# Patient Record
Sex: MALE | Race: WHITE
[De-identification: names, ages, dates, MRNs, and addresses within clinical notes are randomized per-mention and may not be internally consistent; named-entity substitution may affect disease eponyms.]

---

## 2021-01-17 ENCOUNTER — HOSPITAL ENCOUNTER (EMERGENCY)
Dept: HOSPITAL 41 - JD.ED | Age: 61
Discharge: NURSING, REHAB OR LONG-TERM CARE | End: 2021-01-17
Payer: COMMERCIAL

## 2021-01-17 DIAGNOSIS — M19.90: ICD-10-CM

## 2021-01-17 DIAGNOSIS — R79.1: Primary | ICD-10-CM

## 2021-01-17 DIAGNOSIS — R58: ICD-10-CM

## 2021-01-17 DIAGNOSIS — Z95.2: ICD-10-CM

## 2021-01-17 DIAGNOSIS — R00.0: ICD-10-CM

## 2021-01-17 DIAGNOSIS — Z79.01: ICD-10-CM

## 2021-01-17 DIAGNOSIS — Z20.822: ICD-10-CM

## 2021-01-17 DIAGNOSIS — Z79.899: ICD-10-CM

## 2021-01-17 LAB — SARS-COV-2 RNA RESP QL NAA+PROBE: NEGATIVE

## 2021-01-17 PROCEDURE — 84484 ASSAY OF TROPONIN QUANT: CPT

## 2021-01-17 PROCEDURE — 82553 CREATINE MB FRACTION: CPT

## 2021-01-17 PROCEDURE — 96368 THER/DIAG CONCURRENT INF: CPT

## 2021-01-17 PROCEDURE — 96365 THER/PROPH/DIAG IV INF INIT: CPT

## 2021-01-17 PROCEDURE — 93005 ELECTROCARDIOGRAM TRACING: CPT

## 2021-01-17 PROCEDURE — P9017 PLASMA 1 DONOR FRZ W/IN 8 HR: HCPCS

## 2021-01-17 PROCEDURE — 74177 CT ABD & PELVIS W/CONTRAST: CPT

## 2021-01-17 PROCEDURE — C9132 KCENTRA, PER I.U.: HCPCS

## 2021-01-17 PROCEDURE — 86900 BLOOD TYPING SEROLOGIC ABO: CPT

## 2021-01-17 PROCEDURE — 86140 C-REACTIVE PROTEIN: CPT

## 2021-01-17 PROCEDURE — 86901 BLOOD TYPING SEROLOGIC RH(D): CPT

## 2021-01-17 PROCEDURE — 71045 X-RAY EXAM CHEST 1 VIEW: CPT

## 2021-01-17 PROCEDURE — 85610 PROTHROMBIN TIME: CPT

## 2021-01-17 PROCEDURE — 85730 THROMBOPLASTIN TIME PARTIAL: CPT

## 2021-01-17 PROCEDURE — 99285 EMERGENCY DEPT VISIT HI MDM: CPT

## 2021-01-17 PROCEDURE — 83690 ASSAY OF LIPASE: CPT

## 2021-01-17 PROCEDURE — 85025 COMPLETE CBC W/AUTO DIFF WBC: CPT

## 2021-01-17 PROCEDURE — 87040 BLOOD CULTURE FOR BACTERIA: CPT

## 2021-01-17 PROCEDURE — 36415 COLL VENOUS BLD VENIPUNCTURE: CPT

## 2021-01-17 PROCEDURE — 83605 ASSAY OF LACTIC ACID: CPT

## 2021-01-17 PROCEDURE — 86850 RBC ANTIBODY SCREEN: CPT

## 2021-01-17 PROCEDURE — 80053 COMPREHEN METABOLIC PANEL: CPT

## 2021-01-17 PROCEDURE — 0240U: CPT

## 2021-01-17 PROCEDURE — 74176 CT ABD & PELVIS W/O CONTRAST: CPT

## 2021-01-17 PROCEDURE — 36430 TRANSFUSION BLD/BLD COMPNT: CPT

## 2021-01-17 NOTE — EDM.PDOC
ED HPI GENERAL MEDICAL PROBLEM





- General


Chief Complaint: Chest Pain


Stated Complaint: [facility]


Time Seen by Provider: 01/17/21 16:12


Source of Information: Reports: Patient, RN Notes Reviewed


History Limitations: Reports: No Limitations





- History of Present Illness


INITIAL COMMENTS - FREE TEXT/NARRATIVE: 





Patient is a 60-year-old male who is brought into the  ER by [place] ambulance

service for the evaluation of his right flank/lower back pain.  Patient notes 

this started last night, he did google some things, and thought maybe it could 

be his appendix.  He states that the pain or discomfort sometimes settles into 

his chest, that seemed to be more oa congestion/pressure feeling and would rate 

this at a 2 out of 10.   He notes he has had one stent in his heart placed 

roughly 20 years ago for rheumatic fever complications. He also has a prosthetic

heart valve and is on Warfarin. He denies any shortness of breath at rest, but 

does complain about getting short of breath with exercise, he states he was 

mildly diaphoretic when the pain was at its worst, but he is not diaphoretic 

now.  He is not had pain like this prior to yesterday.  He does have a history 

of arthritis and does take meloxicam, and states he did take some of this last 

night and it seemed to help his back pain.  He has had no fevers or chills, 

cough, or any nausea/vomiting/diarrhea.


  ** Chest


Pain Score (Numeric/FACES): 2





- Related Data


                                    Allergies











Allergy/AdvReac Type Severity Reaction Status Date / Time


 


No Known Allergies Allergy   Verified 01/17/21 15:58











Home Meds: 


                                    Home Meds





DULoxetine [Cymbalta] 60 mg PO DAILY 01/17/21 [History]


Meloxicam 15 mg PO DAILY 01/17/21 [History]


Rosuvastatin [Crestor] 10 mg PO DAILY 01/17/21 [History]


Warfarin [Coumadin] 2.5 mg PO MOWEFRSA 01/17/21 [History]


Warfarin [Coumadin] 5 mg PO SUTUTH 01/17/21 [History]











Past Medical History


Cardiovascular History: Reports: Stents


Musculoskeletal History: Reports: Arthritis





- Infectious Disease History


Infectious Disease History: Reports: Measles





Social & Family History





- Tobacco Use


Tobacco Use Status *Q: Never Tobacco User





- Caffeine Use


Caffeine Use: Reports: Coffee





- Recreational Drug Use


Recreational Drug Use: No





ED ROS GENERAL





- Review of Systems


Review Of Systems: Comprehensive ROS is negative, except as noted in HPI.





ED EXAM, GENERAL





- Physical Exam


Exam: See Below


Exam Limited By: No Limitations


General Appearance: Alert, WD/WN, No Apparent Distress


Respiratory/Chest: No Respiratory Distress, Lungs Clear, Normal Breath Sounds, 

No Accessory Muscle Use, Chest Non-Tender


Cardiovascular: Normal Peripheral Pulses, Regular Rate, Rhythm, No Murmur


Peripheral Pulses: 2+: Radial (L), Radial (R)


Extremities: Normal Inspection, Normal Capillary Refill


Neurological: Alert, Oriented, Normal Cognition, No Motor/Sensory Deficits


Psychiatric: Normal Mood, Flat Affect


Skin Exam: Warm, Dry, Intact, Normal Color, No Rash


  ** #1 Interpretation


EKG Date: 01/17/21


Time: 15:49


Rhythm: NSR (sinus tach)


Rate (Beats/Min): 108


Axis: LAD-Left Axis Deviation (-26 )


P-Wave: Present


QRS: Normal


ST-T: Normal


QT: Normal


Comparison: NA - No Prior EKG


EKG Interpretation Comments: 





No obvious ischemia or acute ST changes noted, reviewed by myself and Dr. Henry.





Course





- Vital Signs


Last Recorded V/S: 


                                Last Vital Signs











Temp  96.6 F L  01/17/21 19:39


 


Pulse  104 H  01/17/21 20:06


 


Resp  13   01/17/21 20:06


 


BP  113/70   01/17/21 20:06


 


Pulse Ox  100   01/17/21 15:52














- Orders/Labs/Meds


Orders: 


                               Active Orders 24 hr











 Category Date Time Status


 


 CULTURE BLOOD [BC] Stat Lab  01/17/21 17:29 Received


 


 CULTURE BLOOD [BC] Stat Lab  01/17/21 17:45 Received


 


 UA W/MICROSCOPIC [URIN] Stat Lab  01/17/21 17:06 Ordered


 


 Blood Culture x2 Reflex Set [OM.PC] Stat Oth  01/17/21 17:11 Ordered


 


 Transfuse Fresh Frozen Plasma [COMM] Stat Oth  01/17/21 18:15 Ordered


 


 Transfuse Fresh Frozen Plasma [COMM] Stat Oth  01/17/21 18:15 Ordered











Labs: 


                                Laboratory Tests











  01/17/21 01/17/21 01/17/21 Range/Units





  16:17 16:17 16:17 


 


WBC  26.80 H    (4.23-9.07)  K/mm3


 


RBC  3.43 L    (4.63-6.08)  M/mm3


 


Hgb  10.2 L    (13.7-17.5)  gm/dl


 


Hct  31.4 L    (40.1-51.0)  %


 


MCV  91.5    (79.0-92.2)  fl


 


MCH  29.7    (25.7-32.2)  pg


 


MCHC  32.5    (32.2-35.5)  g/dl


 


RDW Std Deviation  48.0 H    (35.1-43.9)  fL


 


Plt Count  511 H    (163-337)  K/mm3


 


MPV  10.9    (9.4-12.3)  fl


 


Neut % (Auto)  92.3 H    (34.0-67.9)  %


 


Lymph % (Auto)  4.3 L    (21.8-53.1)  %


 


Mono % (Auto)  3.0 L    (5.3-12.2)  %


 


Eos % (Auto)  0 L    (0.8-7.0)  


 


Baso % (Auto)  0.1    (0.1-1.2)  %


 


Neut # (Auto)  24.75 H    (1.78-5.38)  K/mm3


 


Lymph # (Auto)  1.14 L    (1.32-3.57)  K/mm3


 


Mono # (Auto)  0.80    (0.30-0.82)  K/mm3


 


Eos # (Auto)  0.00 L    (0.04-0.54)  K/mm3


 


Baso # (Auto)  0.02    (0.01-0.08)  K/mm3


 


Manual Slide Review  Abnormal smear    


 


PT     (9.7-12.0)  SECONDS


 


INR     


 


APTT     (21.7-31.4)  SECONDS


 


Sodium   141   (136-145)  mEq/L


 


Potassium   4.5   (3.5-5.1)  mEq/L


 


Chloride   105   ()  mEq/L


 


Carbon Dioxide   22   (21-32)  mEq/L


 


Anion Gap   18.5 H   (5-15)  


 


BUN   22 H   (7-18)  mg/dL


 


Creatinine   1.8 H   (0.7-1.3)  mg/dL


 


Est Cr Clr Drug Dosing   42.75   mL/min


 


Estimated GFR (MDRD)   39   (>60)  mL/min


 


BUN/Creatinine Ratio   12.2 L   (14-18)  


 


Glucose   201 H   ()  mg/dL


 


Lactic Acid     (0.4-2.0)  mmol/L


 


Calcium   8.7   (8.5-10.1)  mg/dL


 


Total Bilirubin   1.0   (0.2-1.0)  mg/dL


 


AST   31   (15-37)  U/L


 


ALT   27   (16-63)  U/L


 


Alkaline Phosphatase   60   ()  U/L


 


CK-MB (CK-2)   1.5   (0-3.6)  ng/ml


 


Troponin I   0.027   (0.00-0.056)  ng/mL


 


C-Reactive Protein    0.3  (<1.0)  mg/dL


 


Total Protein   6.6   (6.4-8.2)  g/dl


 


Albumin   3.9   (3.4-5.0)  g/dl


 


Globulin   2.7   gm/dL


 


Albumin/Globulin Ratio   1.4   (1-2)  


 


Lipase    118  ()  U/L


 


Influenza Type A RNA     (NEGATIVE)  


 


Influenza Type B RNA     (NEGATIVE)  


 


SARS-CoV-2 RNA (JOSEP)     (NEGATIVE)  


 


Blood Type     


 


Gel Antibody Screen     














  01/17/21 01/17/21 01/17/21 Range/Units





  16:17 16:17 17:29 


 


WBC     (4.23-9.07)  K/mm3


 


RBC     (4.63-6.08)  M/mm3


 


Hgb     (13.7-17.5)  gm/dl


 


Hct     (40.1-51.0)  %


 


MCV     (79.0-92.2)  fl


 


MCH     (25.7-32.2)  pg


 


MCHC     (32.2-35.5)  g/dl


 


RDW Std Deviation     (35.1-43.9)  fL


 


Plt Count     (163-337)  K/mm3


 


MPV     (9.4-12.3)  fl


 


Neut % (Auto)     (34.0-67.9)  %


 


Lymph % (Auto)     (21.8-53.1)  %


 


Mono % (Auto)     (5.3-12.2)  %


 


Eos % (Auto)     (0.8-7.0)  


 


Baso % (Auto)     (0.1-1.2)  %


 


Neut # (Auto)     (1.78-5.38)  K/mm3


 


Lymph # (Auto)     (1.32-3.57)  K/mm3


 


Mono # (Auto)     (0.30-0.82)  K/mm3


 


Eos # (Auto)     (0.04-0.54)  K/mm3


 


Baso # (Auto)     (0.01-0.08)  K/mm3


 


Manual Slide Review     


 


PT   81.3 H*   (9.7-12.0)  SECONDS


 


INR   7.91 H*   


 


APTT   49.6 H   (21.7-31.4)  SECONDS


 


Sodium     (136-145)  mEq/L


 


Potassium     (3.5-5.1)  mEq/L


 


Chloride     ()  mEq/L


 


Carbon Dioxide     (21-32)  mEq/L


 


Anion Gap     (5-15)  


 


BUN     (7-18)  mg/dL


 


Creatinine     (0.7-1.3)  mg/dL


 


Est Cr Clr Drug Dosing     mL/min


 


Estimated GFR (MDRD)     (>60)  mL/min


 


BUN/Creatinine Ratio     (14-18)  


 


Glucose     ()  mg/dL


 


Lactic Acid    4.1 H*  (0.4-2.0)  mmol/L


 


Calcium     (8.5-10.1)  mg/dL


 


Total Bilirubin     (0.2-1.0)  mg/dL


 


AST     (15-37)  U/L


 


ALT     (16-63)  U/L


 


Alkaline Phosphatase     ()  U/L


 


CK-MB (CK-2)     (0-3.6)  ng/ml


 


Troponin I     (0.00-0.056)  ng/mL


 


C-Reactive Protein     (<1.0)  mg/dL


 


Total Protein     (6.4-8.2)  g/dl


 


Albumin     (3.4-5.0)  g/dl


 


Globulin     gm/dL


 


Albumin/Globulin Ratio     (1-2)  


 


Lipase     ()  U/L


 


Influenza Type A RNA     (NEGATIVE)  


 


Influenza Type B RNA     (NEGATIVE)  


 


SARS-CoV-2 RNA (JOSEP)     (NEGATIVE)  


 


Blood Type  B POSITIVE    


 


Gel Antibody Screen  Negative    














  01/17/21 Range/Units





  18:15 


 


WBC   (4.23-9.07)  K/mm3


 


RBC   (4.63-6.08)  M/mm3


 


Hgb   (13.7-17.5)  gm/dl


 


Hct   (40.1-51.0)  %


 


MCV   (79.0-92.2)  fl


 


MCH   (25.7-32.2)  pg


 


MCHC   (32.2-35.5)  g/dl


 


RDW Std Deviation   (35.1-43.9)  fL


 


Plt Count   (163-337)  K/mm3


 


MPV   (9.4-12.3)  fl


 


Neut % (Auto)   (34.0-67.9)  %


 


Lymph % (Auto)   (21.8-53.1)  %


 


Mono % (Auto)   (5.3-12.2)  %


 


Eos % (Auto)   (0.8-7.0)  


 


Baso % (Auto)   (0.1-1.2)  %


 


Neut # (Auto)   (1.78-5.38)  K/mm3


 


Lymph # (Auto)   (1.32-3.57)  K/mm3


 


Mono # (Auto)   (0.30-0.82)  K/mm3


 


Eos # (Auto)   (0.04-0.54)  K/mm3


 


Baso # (Auto)   (0.01-0.08)  K/mm3


 


Manual Slide Review   


 


PT   (9.7-12.0)  SECONDS


 


INR   


 


APTT   (21.7-31.4)  SECONDS


 


Sodium   (136-145)  mEq/L


 


Potassium   (3.5-5.1)  mEq/L


 


Chloride   ()  mEq/L


 


Carbon Dioxide   (21-32)  mEq/L


 


Anion Gap   (5-15)  


 


BUN   (7-18)  mg/dL


 


Creatinine   (0.7-1.3)  mg/dL


 


Est Cr Clr Drug Dosing   mL/min


 


Estimated GFR (MDRD)   (>60)  mL/min


 


BUN/Creatinine Ratio   (14-18)  


 


Glucose   ()  mg/dL


 


Lactic Acid   (0.4-2.0)  mmol/L


 


Calcium   (8.5-10.1)  mg/dL


 


Total Bilirubin   (0.2-1.0)  mg/dL


 


AST   (15-37)  U/L


 


ALT   (16-63)  U/L


 


Alkaline Phosphatase   ()  U/L


 


CK-MB (CK-2)   (0-3.6)  ng/ml


 


Troponin I   (0.00-0.056)  ng/mL


 


C-Reactive Protein   (<1.0)  mg/dL


 


Total Protein   (6.4-8.2)  g/dl


 


Albumin   (3.4-5.0)  g/dl


 


Globulin   gm/dL


 


Albumin/Globulin Ratio   (1-2)  


 


Lipase   ()  U/L


 


Influenza Type A RNA  Negative  (NEGATIVE)  


 


Influenza Type B RNA  Negative  (NEGATIVE)  


 


SARS-CoV-2 RNA (JOSEP)  Negative  (NEGATIVE)  


 


Blood Type   


 


Gel Antibody Screen   











Meds: 


Medications














Discontinued Medications














Generic Name Dose Route Start Last Admin





  Trade Name Freq  PRN Reason Stop Dose Admin


 


Factor IX (Pha)  4,300 unit  01/17/21 18:39  01/17/21 18:59





  Kcentra  IV  01/17/21 18:40  4,300 unit





  ONETIME ONE   Administration


 


Sodium Chloride  1,000 mls @ 999 mls/hr  01/17/21 16:20  01/17/21 16:42





  Normal Saline  IV  01/17/21 17:20  999 mls/hr





  ONETIME ONE   Administration


 


Sodium Chloride  1,000 mls @ 999 mls/hr  01/17/21 17:12  01/17/21 18:04





  Normal Saline  IV  01/17/21 18:12  999 mls/hr





  ONETIME ONE   Administration


 


Phytonadione 2 mg/ Sodium  51 mls @ 100 mls/hr  01/17/21 18:14 





  Chloride  IV  01/17/21 18:44 





  ONETIME ONE  


 


Sodium Chloride  Confirm  01/17/21 18:29  01/17/21 18:45





  Normal Saline  Administered  01/17/21 18:30  Not Given





  Dose  





  50 mls @ as directed  





  .ROUTE  





  .STK-MED ONE  


 


Phytonadione 2 mg/ Sodium  50.2 mls @ 98.431 mls/hr  01/17/21 18:45  01/17/21 

18:44





  Chloride  IV  01/17/21 19:15  98.431 mls/hr





  ONETIME ONE   Administration


 


Iopamidol  100 ml  01/17/21 17:28  01/17/21 17:40





  Isovue-300 (61%)  IVPUSH  01/17/21 17:29  100 ml





  ONETIME ONE   Administration


 


Sodium Chloride  10 ml  01/17/21 17:40  01/17/21 17:41





  Saline Flush  FLUSH   10 ml





  ONETIME PRN   Administration





  Keep Vein Open  














- Radiology Interpretation


Free Text/Narrative:: 





CT abdomen and pelvis w/o contrast





Technique: Multiple axial sections were obtained.  Intravenous and oral contrast

not utilized.  Study was performed as a ureteral stone protocol.  Reconstructed 

coronal and sagittal images were obtained.





Findings: Increased density is identified within the posterior pararenal space 

extending slightly to the anterior pararenal space.  This is most likely due to 

blood.  This also continues into the pelvis with mild increased density within 

the pelvis being seen.





Right kidney shows a very small calcification most likely representing minimal 

nonobstructing stone.  Both ureters show no dilatation with no evidence of 

ureteral calculi.





Visualized lung bases show nothing acute.





Visualized portions of the liver contain no focal parenchymal abnormality.  

Gallbladder shows a small calcified gallstone.  Spleen is normal.





Pancreas is normal.  Adrenal glands show no nodule.





Aorta shows atherosclerotic calcification with no aneurysm.  No retroperitoneal 

adenopathy is seen.  No additional mesenteric abnormalities are appreciated.  No

pelvic mass is appreciated.





Bone window settings were reviewed which shows mild degenerative change within 

the spine.  No acute osseous abnormality is appreciated.





Impression:


1.  Fluid within the posterior right pararenal space pushing the right kidney 

anteriorly.  Lesser density seen within the anterior pararenal space.  This 

fluid is most likely due to fair amount of blood.


2.  This blood extends into the pelvis.  Etiology of this blood is not 

definitely appreciated on this exam.  Consider IV contrast to further evaluate 

if no etiology is apparent clinically.  Small nonobstructing gallstone within 

the gallbladder.  Small nonobstructing calculus within the right kidney.


4.  Other findings as noted above which are believed to be incidental.





- Re-Assessments/Exams


Free Text/Narrative Re-Assessment/Exam: 





01/17/21 16:24


Patient presents to the ED for his abdomen pain.  Patient has quite a flat 

affect and I am not really sure where the source of his pain started he notes it

started in his back and now complains of some congestion in his chest.  For 

today's purposes we will get some basic labs, EKG was obtained at time of triage

demonstrates sinus tach but no other major abnormalities reviewed by myself and 

Dr. Henry.  Blood pressure still is on the low side of normal at 96 

systolically when I went in to evaluate him.  He will get a bolus of fluids, we 

will also get an abdomen pelvis CT without contrast for evaluation.





01/17/21 17:13


Patient CBC did come back quite elevated at 26,000, with 92% neutrophils.  Due 

to his heart rate being tachycardic, and having hypotension.  This does rule him

in for sepsis criteria for the most part; He will get a second bolus IV fluids, 

have ordered lactate for sepsis with reflex, blood cultures, CRP, and other labs

for evaluation.





01/17/21 17:28


CT has been done, and demonstrates blood within the pelvis, the radiologist 

cannot define where it is coming from.  He does recommend doing one with IV 

contrast.  CT tech was still here and she will perform this urgently.  We are 

giving plenty of fluids due to him ruling into sepsis criteria.  Creatinine 

mildly elevated 1.8 and GFR was slightly low at 39.  We will deal with that 

after the fact.





01/17/21 18:18


 we did get the CT with contrast and our radiologist seems to think that there 

is an area of bleeding coming from the psoas muscle, which would be compatible 

with a retroperitoneal bleed.  Patient is on warfarin we will start on vitamin 

K, get a unit of fresh frozen plasma started.  I did call her surgeon, Dr. Iris wiseman and he believes the patient would benefit from a higher level of care due to 

the possibility of needing IR and close follow-up.  At this point in time we are

checking on flight status as her ambulance was just sent out with another 

critical patient for transfer.





01/17/21 18:58


Was able to consult with multiple doctors at [facility], and ultimately Dr. Iqbal, the interventional radiologist does accept for transfer.  patient will go 

to Dr. Gilbert in the ER for initial management and then Dr. Iqbal will be 

dispatched from there.  Patient has been started on vitamin K, fresh frozen 

plasma, and Kcentra for anticoagulation management.  Patient's blood pressure is

stable at 112/64, heart rate is 103 bpm, O2 sats 95% on room air.  Patient is 

not complaining of too much pain, and seems in good spirits at this time.  We 

will send him to [facility] in [place] via fixed wing flight through 

[facility].





Departure





- Departure


Time of Disposition: 19:00


Disposition: DC/Tfer to Acute Hospital 02


Reason for Transfer *Q: Other (acute retroperitoneal bleed, needs IR)


Condition: Serious


Clinical Impression: 


 Retroperitoneal bleeding, Supratherapeutic INR





Referrals: 


Yvette Horne MD [Primary Care Provider] - 


Forms:  ED Department Discharge





Sepsis Event Note (ED)





- Evaluation


Sepsis Screening Result: No Definite Risk





- Focused Exam


Vital Signs: 


                                   Vital Signs











  Temp Temp Pulse Resp BP Pulse Ox


 


 01/17/21 20:06    104 H  13  113/70 


 


 01/17/21 19:39  96.6 F L   104 H  22 H  111/64 


 


 01/17/21 15:52   98.0 F  104 H  18  107/63  100














- My Orders


Last 24 Hours: 


My Active Orders





01/17/21 17:06


UA W/MICROSCOPIC [URIN] Stat 





01/17/21 17:11


Blood Culture x2 Reflex Set [OM.PC] Stat 





01/17/21 17:29


CULTURE BLOOD [BC] Stat 





01/17/21 17:45


CULTURE BLOOD [BC] Stat 





01/17/21 18:15


Transfuse Fresh Frozen Plasma [COMM] Stat 


Transfuse Fresh Frozen Plasma [COMM] Stat 














- Assessment/Plan


Last 24 Hours: 


My Active Orders





01/17/21 17:06


UA W/MICROSCOPIC [URIN] Stat 





01/17/21 17:11


Blood Culture x2 Reflex Set [OM.PC] Stat 





01/17/21 17:29


CULTURE BLOOD [BC] Stat 





01/17/21 17:45


CULTURE BLOOD [BC] Stat 





01/17/21 18:15


Transfuse Fresh Frozen Plasma [COMM] Stat 


Transfuse Fresh Frozen Plasma [COMM] Stat

## 2021-01-17 NOTE — CT
CT abdomen and pelvis

 

Technique: Multiple axial sections were obtained from above the dome 

of the diaphragm inferiorly through the pubic symphysis.  Intravenous 

contrast was utilized.  No oral contrast was given.

 

Comparison: Previous noncontrast CT abdomen and pelvis study performed

 earlier on the same day (4:59 PM).

 

Findings: Increased density is again seen within the right pararenal 

space pushing the right kidney anteriorly.  Fluid is also noted within

 the pelvis.  There are some enhancing vessels being seen within the 

periphery of an enlarged psoas muscle or within the pararenal space 

presumably representing an abnormal leaking vessel.  Additional mild 

areas of enhancement are seen within this area compatible with fairly 

recent hematoma.

 

Visualized lung bases show nothing acute.  Contrast enhanced liver 

appears within normal limits.  Spleen appears normal.  Kidneys show 

symmetric contrast enhancement without hydronephrosis or mass.  

Pancreas shows no discrete abnormality.  Fluid around the pancreas on 

prior study has diminished with no findings of pancreatitis.  Aorta 

shows atherosclerotic change without aneurysm.  No retroperitoneal 

adenopathy is seen.  No other pelvic abnormality is seen.

 

Delayed images were obtained which show contrast within the ureters 

and the bladder.

 

Bone window settings were reviewed which show mild degenerative change

 without acute finding.

 

Impression:

1.  Findings compatible with previous blood.  Some enhancement is seen

 within this area with what appears to be a small vessel within an 

enlarged psoas muscle or possibly within the hemorrhage.  Findings are

 felt compatible with continuing acute bleed.  Recommend surgical 

evaluation.

2.  Continuing pararenal space hematoma extending into the pelvis.

3.  Other findings as noted above which are incidental.

 

Diagnostic code #5

## 2021-01-17 NOTE — CR
Chest: Portable view of the chest was obtained.

 

Comparison: No previous chest x-rays are available.

 

Heart size and mediastinum are normal.  Sternotomy is noted for 

prosthetic valve.  Lungs are clear with no acute parenchymal change.  

Bony structures are grossly intact.

 

Impression:

1.  Nothing acute is seen on portable chest x-ray.

 

Diagnostic code #2

## 2021-01-17 NOTE — CT
CT abdomen and pelvis

 

Technique: Multiple axial sections were obtained from below the top of

 the liver inferiorly through the bladder.  Intravenous and oral 

contrast was not utilized.  Study was performed as a ureteral stone 

protocol.  Reconstructed coronal and sagittal images were also 

obtained.

 

Findings: Increased density is identified within the posterior 

pararenal space extending slightly to the anterior pararenal space.  

This is most likely due to blood.  This also continues into the pelvis

 with mild increased density within the pelvis being seen.

 

Right kidney shows a very small calcification most likely representing

 minimal nonobstructing stone.  Both ureters show no dilatation with 

no evidence of ureteral calculi.

 

Visualized lung bases show nothing acute.

 

Visualized portions of the liver contain no focal parenchymal 

abnormality.  Gallbladder shows a small calcified gallstone.  Spleen 

is normal.

 

Pancreas is normal.  Adrenal glands show no nodule.

 

Aorta shows atherosclerotic calcification with no aneurysm.  No 

retroperitoneal adenopathy is seen.  No additional mesenteric 

abnormalities are appreciated.  No pelvic mass is appreciated.

 

Bone window settings were reviewed which show mild degenerative change

 within the spine.  No acute osseous abnormality is appreciated.

 

Impression:

1.  Fluid within the posterior right pararenal space pushing the right

 kidney anteriorly.  Lesser density is seen within the anterior 

pararenal space.  This fluid is most likely due to a fair amount of 

blood.

2.  This blood extends into the pelvis.  Etiology of this blood is not

 definitely appreciated on this exam.  Consider IV contrast to further

 evaluate if no etiology is apparent clinically.  

3.  Small nonobstructing gallstone within the gallbladder.  Small 

nonobstructing calculus within the right kidney.

4.  Other findings as noted above which are believed to be incidental.

 

Diagnostic code #5